# Patient Record
Sex: FEMALE | Race: WHITE | URBAN - NONMETROPOLITAN AREA
[De-identification: names, ages, dates, MRNs, and addresses within clinical notes are randomized per-mention and may not be internally consistent; named-entity substitution may affect disease eponyms.]

---
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
## 2022-04-15 ASSESSMENT — VISUAL ACUITY
OS_CC: 20/25
OD_CC: 20/25

## 2022-04-15 ASSESSMENT — TONOMETRY
OS_IOP_MMHG: 10
OD_IOP_MMHG: 13

## 2022-06-22 NOTE — PROCEDURE NOTE: SURGICAL
<p>Prior to commencing surgery patient identification, surgical procedure, site, and side were confirmed by Dr. Merrill Christopher. <span>&nbsp; </span>Following topical proparacaine anesthesia, the patient was positioned at the YAG laser, a contact lens coupled to the cornea of the right eye with methylcellulose and an axial posterior capsulotomy performed without complication using 3.4 Mj x 27. Attention was then turned to the left eye and a contact lens coupled to the cornea of the left eye with methylcellulose and an axial posterior capsulotomy performed without complication using 3.4 Mj x 30. One drop of Alphagan was instilled in both eyes and the patient returned to the holding area having tolerated the procedure well and without complication. </p>MR [id number]